# Patient Record
Sex: FEMALE | URBAN - METROPOLITAN AREA
[De-identification: names, ages, dates, MRNs, and addresses within clinical notes are randomized per-mention and may not be internally consistent; named-entity substitution may affect disease eponyms.]

---

## 2020-10-09 ENCOUNTER — OFFICE VISIT (OUTPATIENT)
Dept: URBAN - METROPOLITAN AREA CLINIC 62 | Facility: CLINIC | Age: 60
End: 2020-10-09
Payer: COMMERCIAL

## 2020-10-09 DIAGNOSIS — H52.13 MYOPIA, BILATERAL: Primary | ICD-10-CM

## 2020-10-09 PROCEDURE — 92002 INTRM OPH EXAM NEW PATIENT: CPT | Performed by: OPTOMETRIST

## 2020-10-09 ASSESSMENT — INTRAOCULAR PRESSURE
OD: 19
OS: 19

## 2020-10-09 ASSESSMENT — KERATOMETRY
OD: 43.88
OS: 43.50

## 2020-10-09 ASSESSMENT — VISUAL ACUITY
OS: 20/20
OD: 20/15

## 2020-10-13 ENCOUNTER — OFFICE VISIT (OUTPATIENT)
Dept: URBAN - METROPOLITAN AREA CLINIC 62 | Facility: CLINIC | Age: 60
End: 2020-10-13
Payer: OTHER GOVERNMENT

## 2020-10-13 DIAGNOSIS — H04.123 DRY EYE SYNDROME OF BILATERAL LACRIMAL GLANDS: ICD-10-CM

## 2020-10-13 DIAGNOSIS — D48.1 NEOPLASM OF UNCERTAIN BEHAVIOR OF CONNECTIVE AND OTHER SOFT TISSUE: Primary | ICD-10-CM

## 2020-10-13 DIAGNOSIS — H25.13 AGE-RELATED NUCLEAR CATARACT, BILATERAL: ICD-10-CM

## 2020-10-13 PROCEDURE — 92014 COMPRE OPH EXAM EST PT 1/>: CPT | Performed by: OPTOMETRIST

## 2020-10-13 ASSESSMENT — VISUAL ACUITY
OD: 20/20
OS: 20/20

## 2020-10-13 ASSESSMENT — INTRAOCULAR PRESSURE
OD: 18
OS: 19

## 2020-10-13 NOTE — IMPRESSION/PLAN
Impression: Neoplasm of uncertain behavior of connective and other soft tissue: D48.1. Plan: Onset about 2 years. Discussed diagnosis in detail with patient. Consult recommended [[facility]].  Failed conservative management with steroid ointment in the past.